# Patient Record
Sex: MALE | Race: WHITE | URBAN - METROPOLITAN AREA
[De-identification: names, ages, dates, MRNs, and addresses within clinical notes are randomized per-mention and may not be internally consistent; named-entity substitution may affect disease eponyms.]

---

## 2020-11-18 ENCOUNTER — VIRTUAL VISIT (OUTPATIENT)
Dept: FAMILY MEDICINE | Facility: CLINIC | Age: 55
End: 2020-11-18
Payer: COMMERCIAL

## 2020-11-18 DIAGNOSIS — Z20.822 SUSPECTED COVID-19 VIRUS INFECTION: Primary | ICD-10-CM

## 2020-11-18 PROCEDURE — 99213 OFFICE O/P EST LOW 20 MIN: CPT | Mod: TEL | Performed by: INTERNAL MEDICINE

## 2020-11-18 RX ORDER — BETAMETHASONE DIPROPIONATE 0.5 MG/G
LOTION TOPICAL
COMMUNITY
Start: 2020-06-24

## 2020-11-18 RX ORDER — MONTELUKAST SODIUM 10 MG/1
10 TABLET ORAL
COMMUNITY
Start: 2020-01-20

## 2020-11-18 RX ORDER — DILTIAZEM HYDROCHLORIDE 60 MG/1
TABLET, FILM COATED ORAL
COMMUNITY
Start: 2020-04-08

## 2020-11-18 RX ORDER — ALBUTEROL SULFATE 0.83 MG/ML
2.5 SOLUTION RESPIRATORY (INHALATION)
COMMUNITY
Start: 2020-01-20

## 2020-11-18 RX ORDER — ALBUTEROL SULFATE 90 UG/1
2 AEROSOL, METERED RESPIRATORY (INHALATION)
COMMUNITY
Start: 2020-01-20

## 2020-11-18 NOTE — PROGRESS NOTES
"Tom Durham is a 55 year old male who is being evaluated via a billable telephone visit.      The patient has been notified of following:     \"This telephone visit will be conducted via a call between you and your physician/provider. We have found that certain health care needs can be provided without the need for a physical exam.  This service lets us provide the care you need with a short phone conversation.  If a prescription is necessary we can send it directly to your pharmacy.  If lab work is needed we can place an order for that and you can then stop by our lab to have the test done at a later time.    Telephone visits are billed at different rates depending on your insurance coverage. During this emergency period, for some insurers they may be billed the same as an in-person visit.  Please reach out to your insurance provider with any questions.    If during the course of the call the physician/provider feels a telephone visit is not appropriate, you will not be charged for this service.\"    Patient has given verbal consent for Telephone visit?  Yes    What phone number would you like to be contacted at? [phone]    How would you like to obtain your AVS? Mail a copy    Subjective     Tom Durham is a 55 year old male who presents via phone visit today for the following health issues:    HPI   Looks at [facility]  Presented with fever and chills for 1 day along with nausea  He also had diarrhea  Feels very weak  Denies any cough  Mild dyspnea but he is attributing it to weakness  Work wanted him to get tested for Covid  Denies any chest pain    Concern for COVID-19  About how many days ago did these symptoms start? x1 day  Is this your first visit for this illness? Yes  In the 14 days before your symptoms started, have you had close contact with someone with COVID-19 (Coronavirus)? No  Do you have a fever or chills? Yes, I felt feverish or had chills  Are you having new or worsening difficulty " breathing? Yes   Please describe what kind of difficulty you are having breathing:Mild dyspnea (decreased exercise tolerance, able to do ADLs without difficulty)  Do you have new or worsening cough? No  Have you had any new or unexplained body aches? YES    Have you experienced any of the following NEW symptoms?    Headache: YES    Sore throat: No    Loss of taste or smell: No    Chest pain: No    Diarrhea: YES    Rash: No  What treatments have you tried? OTC Advil  Who do you live with? Wife  Are you, or a household member, a healthcare worker or a ? No  Do you live in a nursing home, group home, or shelter? No  Do you have a way to get food/medications if quarantined? Yes, I have a friend or family member who can help me.                   Review of Systems   Constitutional, HEENT, cardiovascular, pulmonary, gi and gu systems are negative, except as otherwise noted.       Objective          Vitals:  No vitals were obtained today due to virtual visit.    healthy, alert and no distress  PSYCH: Alert and oriented times 3; coherent speech, normal   rate and volume, able to articulate logical thoughts, able   to abstract reason, no tangential thoughts, no hallucinations   or delusions  His affect is normal  RESP: No cough, no audible wheezing, able to talk in full sentences  Remainder of exam unable to be completed due to telephone visits            Assessment/Plan:    Assessment & Plan     Suspected COVID-19 virus infection  Symptoms started yesterday night  Could be flu but first clear to rule out Covid  Rule out a Covid test  Advised him to call me back if he develops any shortness of breath or cough or if the fever starts going up more  Quarantine measures discussed  - Symptomatic COVID-19 Virus (Coronavirus) by PCR; Future            Return if symptoms worsen or fail to improve.    Thiago Ayers MD  [facility]    Phone call duration:  8 minutes